# Patient Record
(demographics unavailable — no encounter records)

---

## 2025-01-13 NOTE — DATA REVIEWED
[MRI] : MRI [Cervical Spine] : cervical spine [I independently reviewed and interpreted images and report] : I independently reviewed and interpreted images and report [FreeTextEntry1] : 12/2024 MRI of C-Spine was reviewed today and is as follows:  DDD C3-4, C5-6 with Modic changes, C6-7, C7-T1 Severe bilateral foraminal stenosis C3-4 Mild stenosis C4-5 Moderate to severe stenosis at C5-6  Moderate to severe stenosis C6-7

## 2025-03-24 NOTE — ASSESSMENT
[FreeTextEntry1] : Neck:  12/2024 MRI of C-Spine was reviewed today and is as follows:  DDD C3-4, C5-6 with Modic changes, C6-7, C7-T1 Severe bilateral foraminal stenosis C3-4 Mild stenosis C4-5 Moderate to severe stenosis at C5-6  Moderate to severe stenosis C6-7  Patient also with persistent neck pain and radiculopathy. Patient had LUIS EDUARDO with Dr. Carranza with 50% relief in symptoms for 3 weeks and occipital block with no relief. We discussed treatment options including LUIS EDUARDO vs surgical management. After discussion, patient elected to proceed with another injection prior to considering surgery.   - Referral to pain management Dr. Carranza for RUSSEL   - Patient will continue HEP. Emphasized daily stretching and strengthening.   - Recommend NSAIDs PRN - Recommend heating pad use to decrease muscle spasm - Discussed the importance of home exercises, including but not limited to neck stretching and cervical traction   Patient was educated on their diagnosis today. All questions answered and patient expressed understanding.  Follow up in 2 months

## 2025-03-24 NOTE — HISTORY OF PRESENT ILLNESS
[de-identified] : Body part: neck  Better/ Worse/ Same since last visit: better Treatments since last visit: Had occipital nerve block with Dr. Carranza's office with no relief, Had LUIS EDUARDO with Dr. Carranza's office with about 50% relief for 3 weeks, HEP Difficulty with: ROM, driving, prolonged standing Radicular symptoms: numbness in bilateral forearms and hands Current medications for pain: Cyclobenzaprine and Lyrica Assistive walking device: none   Today's Pain:  4/10

## 2025-05-08 NOTE — DISCUSSION/SUMMARY
[de-identified] : I discussed non operative and operative treatment options in great detail with the patient. I discussed treatment options, including but not limited to, 1. Non operative treatment - rest, NSAID, physical therapy etc. 2. Interventional treatment - injections etc. 3. Surgical treatment - Anterior cervical discectomy and fusion C5-6, 6-7, autograft, allograft.   - Patient with persistent symptoms refractory to non-operative care. Patient is seeking surgical options. Patient is a candidate for surgery after failing extensive non operative care.  Patient wants to proceed with surgical intervention after failing nonoperative care. I had a long discussion with the patient about the rational and goal of the surgery as well as expected outcome. I explained postoperative rehabilitation and recovery process to the patient. I encouraged patient to seek a second opinion regarding surgery. I discussed risks, benefits and alternatives of the procedure in detail with the patient. I counseled patient about risks and possible complications, including but not limited to, infection, bleeding, nerve injury, arterial and venous injury, single or multiple muscle group weakness, dural tear, CSF leak, pseudomeningocele, arachnoiditis, CSF fistula, meningitis, discitis, osteomyelitis, esophageal tear, dysphagia, hoarseness, Eden's syndrome, thoracic duct injury, epidural hematoma, DVT, PE, CRPS, MI, paralysis, pseudoarthrosis, instrumentation malposition, adjacent segment disease and risks of anesthesia. I explained to the patient that the surgical outcome is unpredictable and there is no guarantee that the symptoms will resolve after the surgery. The patient understands and wishes to proceed. All questions were answered and patient was given information to review.

## 2025-05-08 NOTE — ASSESSMENT
[FreeTextEntry1] : Neck:  12/2024 MRI of C-Spine was reviewed today and is as follows:  DDD C3-4, C5-6 with Modic changes, C6-7, C7-T1 Severe bilateral foraminal stenosis C3-4 Mild stenosis C4-5 Moderate to severe stenosis at C5-6  Moderate to severe stenosis C6-7  Patient also with persistent neck pain and radiculopathy. He has significant weakness, numbness, pain in his RUE as compared to his last visit. He had RUSSEL with Dr. Carranza in past prior to his last visit.   - Patient with persistent symptoms refractory to non-operative care. Patient is seeking surgical options. Patient is a candidate for surgery after failing extensive non operative care.    - Patient will continue HEP. Emphasized daily stretching and strengthening.   - Recommend NSAIDs PRN - Recommend heating pad use to decrease muscle spasm - Discussed the importance of home exercises, including but not limited to neck stretching and cervical traction   Patient was educated on their diagnosis today. All questions answered and patient expressed understanding.  Follow up in 2 months

## 2025-05-08 NOTE — HISTORY OF PRESENT ILLNESS
[de-identified] : Body part: neck  Better/ Worse/ Same since last visit: increased pain and numbness in right arm and digits, severe headaches.  Treatments since last visit: Denies further injections with PM  Difficulty with: ROM, driving, prolonged standing Radicular symptoms: numbness in bilateral forearms and hands Current medications for pain: Cyclobenzaprine and Lyrica Assistive walking device: none    Today's Pain:  ~6/10

## 2025-05-08 NOTE — DISCUSSION/SUMMARY
[de-identified] : I discussed non operative and operative treatment options in great detail with the patient. I discussed treatment options, including but not limited to, 1. Non operative treatment - rest, NSAID, physical therapy etc. 2. Interventional treatment - injections etc. 3. Surgical treatment - Anterior cervical discectomy and fusion C5-6, 6-7, autograft, allograft.   - Patient with persistent symptoms refractory to non-operative care. Patient is seeking surgical options. Patient is a candidate for surgery after failing extensive non operative care.  Patient wants to proceed with surgical intervention after failing nonoperative care. I had a long discussion with the patient about the rational and goal of the surgery as well as expected outcome. I explained postoperative rehabilitation and recovery process to the patient. I encouraged patient to seek a second opinion regarding surgery. I discussed risks, benefits and alternatives of the procedure in detail with the patient. I counseled patient about risks and possible complications, including but not limited to, infection, bleeding, nerve injury, arterial and venous injury, single or multiple muscle group weakness, dural tear, CSF leak, pseudomeningocele, arachnoiditis, CSF fistula, meningitis, discitis, osteomyelitis, esophageal tear, dysphagia, hoarseness, Eden's syndrome, thoracic duct injury, epidural hematoma, DVT, PE, CRPS, MI, paralysis, pseudoarthrosis, instrumentation malposition, adjacent segment disease and risks of anesthesia. I explained to the patient that the surgical outcome is unpredictable and there is no guarantee that the symptoms will resolve after the surgery. The patient understands and wishes to proceed. All questions were answered and patient was given information to review.

## 2025-05-08 NOTE — HISTORY OF PRESENT ILLNESS
[de-identified] : Body part: neck  Better/ Worse/ Same since last visit: increased pain and numbness in right arm and digits, severe headaches.  Treatments since last visit: Denies further injections with PM  Difficulty with: ROM, driving, prolonged standing Radicular symptoms: numbness in bilateral forearms and hands Current medications for pain: Cyclobenzaprine and Lyrica Assistive walking device: none    Today's Pain:  ~6/10